# Patient Record
Sex: FEMALE | Race: BLACK OR AFRICAN AMERICAN | NOT HISPANIC OR LATINO | Employment: UNEMPLOYED | URBAN - METROPOLITAN AREA
[De-identification: names, ages, dates, MRNs, and addresses within clinical notes are randomized per-mention and may not be internally consistent; named-entity substitution may affect disease eponyms.]

---

## 2020-04-20 ENCOUNTER — HOSPITAL ENCOUNTER (EMERGENCY)
Facility: HOSPITAL | Age: 40
Discharge: HOME | End: 2020-04-20
Attending: EMERGENCY MEDICINE
Payer: MEDICAID

## 2020-04-20 VITALS
HEART RATE: 90 BPM | DIASTOLIC BLOOD PRESSURE: 72 MMHG | WEIGHT: 236.69 LBS | OXYGEN SATURATION: 100 % | RESPIRATION RATE: 18 BRPM | TEMPERATURE: 98 F | BODY MASS INDEX: 38.2 KG/M2 | SYSTOLIC BLOOD PRESSURE: 165 MMHG

## 2020-04-20 DIAGNOSIS — S92.424A CLOSED NONDISPLACED FRACTURE OF DISTAL PHALANX OF RIGHT GREAT TOE, INITIAL ENCOUNTER: ICD-10-CM

## 2020-04-20 DIAGNOSIS — G62.9 NEUROPATHY: Primary | ICD-10-CM

## 2020-04-20 LAB — POCT GLUCOSE: 130 MG/DL (ref 70–110)

## 2020-04-20 PROCEDURE — 99283 EMERGENCY DEPT VISIT LOW MDM: CPT | Mod: 25,ER

## 2020-04-20 PROCEDURE — 82962 GLUCOSE BLOOD TEST: CPT | Mod: ER

## 2020-04-20 PROCEDURE — 25000003 PHARM REV CODE 250: Mod: ER | Performed by: EMERGENCY MEDICINE

## 2020-04-20 RX ORDER — HYDROCODONE BITARTRATE AND ACETAMINOPHEN 10; 325 MG/1; MG/1
1 TABLET ORAL
Status: COMPLETED | OUTPATIENT
Start: 2020-04-20 | End: 2020-04-20

## 2020-04-20 RX ADMIN — HYDROCODONE BITARTRATE AND ACETAMINOPHEN 1 TABLET: 10; 325 TABLET ORAL at 01:04

## 2020-04-20 NOTE — ED PROVIDER NOTES
Encounter Date: 2020       History     Chief Complaint   Patient presents with    Numbness     Left leg numbness and numbness to lips, hx of neuropathy, stated that she never had numbness to lips.Pt denied loss of taste and smell      41 y/o F with PMH of HTN, DM, peripheral neuropathy here with c/o right great toe pain and swelling that is worsening over the past several days. The pain is constant, worsens with palpation and walking. Patient reports breaking this toe several times previously due to her neuropathy. Patient additionally reporting general worsening of her neuropathy symptoms in her legs, and reports gradual progression of the tingling sensation into her hands, and her lips as well. She denies any fever, chills, nausea, vomiting, weakness, chest pain, headache, vision changes.         Review of patient's allergies indicates:  No Known Allergies  Past Medical History:   Diagnosis Date    Diabetes mellitus     Hypertension      Past Surgical History:   Procedure Laterality Date     SECTION       No family history on file.  Social History     Tobacco Use    Smoking status: Never Smoker   Substance Use Topics    Alcohol use: No    Drug use: No     Review of Systems   Constitutional: Negative for diaphoresis and fever.   HENT: Negative for congestion, dental problem and sore throat.    Eyes: Negative for pain and visual disturbance.   Respiratory: Negative for cough and shortness of breath.    Cardiovascular: Negative for chest pain and palpitations.   Gastrointestinal: Negative for abdominal pain, diarrhea, nausea and vomiting.   Genitourinary: Negative for dysuria and flank pain.   Musculoskeletal: Positive for arthralgias. Negative for back pain and neck pain.   Skin: Negative for rash and wound.   Neurological: Negative for weakness, numbness and headaches.        Tingling.    Psychiatric/Behavioral: Negative for agitation and confusion.       Physical Exam     Initial Vitals  [04/20/20 1246]   BP Pulse Resp Temp SpO2   (!) 165/72 90 18 98.4 °F (36.9 °C) 100 %      MAP       --         Physical Exam    Constitutional: She appears well-developed and well-nourished.   HENT:   Head: Normocephalic and atraumatic.   Eyes: EOM are normal. Pupils are equal, round, and reactive to light.   Neck: Normal range of motion. Neck supple.   Cardiovascular: Normal rate and regular rhythm.   Pulmonary/Chest: Breath sounds normal. No respiratory distress.   Abdominal: She exhibits no distension. There is no tenderness.   Musculoskeletal:   There is tenderness to the right great toe, and MTP joint. There is edema as well. No redness, or warmth. Patient with good ROM.    Neurological: She is alert and oriented to person, place, and time. She has normal strength. No cranial nerve deficit. GCS score is 15. GCS eye subscore is 4. GCS verbal subscore is 5. GCS motor subscore is 6.   No decreased sensation to light touch.    Skin: Skin is warm and dry.   Psychiatric: She has a normal mood and affect.         ED Course   Procedures  Labs Reviewed   POCT GLUCOSE - Abnormal; Notable for the following components:       Result Value    POCT Glucose 130 (*)     All other components within normal limits   POCT GLUCOSE MONITORING CONTINUOUS          Imaging Results          X-Ray Toe 2 or More Views Right (Final result)  Result time 04/20/20 13:05:43    Final result by Jake Mitchell MD (04/20/20 13:05:43)                 Impression:      See above.      Electronically signed by: Jake Mitchell MD  Date:    04/20/2020  Time:    13:05             Narrative:    EXAMINATION:  XR TOE 2 OR MORE VIEWS RIGHT    CLINICAL HISTORY:  XR TOE 2 OR MORE VIEWS RIGHT    COMPARISON:  None    FINDINGS:  Three views of the right 1st toe were obtained.    Comminuted fracture at the base of the distal phalanx of the 1st toe with associated soft tissue swelling fracture does appear to be extend to the interphalangeal joint..  Bony  mineralization is normal.                                                   ED Course as of Apr 20 1326   Mon Apr 20, 2020   1312 Buddy taped right great toe to the 2nd toe.     [BA]   1315 1:15 PM Reassessment: I reassessed the pt.  The pt is resting comfortably and is NAD.  Pt states their sx have improved. I Discussed test results, shared treatment plan, specific conditions for return, and the need for f/u. I  Answered their questions at this time.  Pt understands and agrees to the plan.  The pt has remained hemodynamically stable through ED course and is stable for discharge.       [BA]   1325 Toes N/V intact s/p buddy taping and post op shoe    [BA]      ED Course User Index  [BA] Omkar Fishman MD                Clinical Impression:       ICD-10-CM ICD-9-CM   1. Neuropathy G62.9 355.9   2. Closed nondisplaced fracture of distal phalanx of right great toe, initial encounter S92.424A 826.0             ED Disposition Condition    Discharge Stable        ED Prescriptions     None        Follow-up Information     Follow up With Specialties Details Why Contact Info    [facility]  Call in 2 days For re-evaluation and further treatment [address] [facility] [address] [place] [id number] [phone]      Pratik Trivedi,  Orthopedic Surgery Schedule an appointment as soon as possible for a visit in 1 week For re-evaluation and further treatment [address] [place] [address] DR Tomasz BERNAL [id number]  [phone]      [facility] Emergency [facility] Call today If symptoms worsen, For re-evaluation and further treatment, As needed [address] [address] [address] [phone]                                     Omkar Fishman MD  04/20/20 1315       Omkar Fishman MD  04/20/20 8826

## 2020-06-11 ENCOUNTER — HOSPITAL ENCOUNTER (EMERGENCY)
Facility: HOSPITAL | Age: 40
Discharge: HOME | End: 2020-06-11
Attending: EMERGENCY MEDICINE
Payer: MEDICAID

## 2020-06-11 VITALS
RESPIRATION RATE: 20 BRPM | DIASTOLIC BLOOD PRESSURE: 89 MMHG | BODY MASS INDEX: 38.79 KG/M2 | HEART RATE: 92 BPM | OXYGEN SATURATION: 99 % | TEMPERATURE: 99 F | SYSTOLIC BLOOD PRESSURE: 159 MMHG | WEIGHT: 240.31 LBS

## 2020-06-11 DIAGNOSIS — E11.42 DIABETIC POLYNEUROPATHY ASSOCIATED WITH TYPE 2 DIABETES MELLITUS: Primary | ICD-10-CM

## 2020-06-11 PROCEDURE — 25000003 PHARM REV CODE 250: Mod: ER | Performed by: EMERGENCY MEDICINE

## 2020-06-11 PROCEDURE — 99283 EMERGENCY DEPT VISIT LOW MDM: CPT | Mod: ER

## 2020-06-11 RX ORDER — HYDROCODONE BITARTRATE AND ACETAMINOPHEN 10; 325 MG/1; MG/1
1 TABLET ORAL
Status: COMPLETED | OUTPATIENT
Start: 2020-06-11 | End: 2020-06-11

## 2020-06-11 RX ORDER — GABAPENTIN 800 MG/1
800 TABLET ORAL 3 TIMES DAILY
COMMUNITY

## 2020-06-11 RX ORDER — LISINOPRIL 20 MG/1
20 TABLET ORAL DAILY
COMMUNITY

## 2020-06-11 RX ADMIN — HYDROCODONE BITARTRATE AND ACETAMINOPHEN 1 TABLET: 10; 325 TABLET ORAL at 01:06

## 2020-06-18 NOTE — ED PROVIDER NOTES
"Encounter Date: 2020       History     Chief Complaint   Patient presents with    Leg Pain     Pt states "my diabetic neuropathy is hurting me in my right leg".     Patient presents with concerns regarding pain in the BLE.  She describes chronic shooting sensation in the R > L legs.  Onset many months ago.  Denies any recent injury.  Patient has been taking Gabapentin but notes her symptoms have now allowed her to rest tonight.          Review of patient's allergies indicates:  No Known Allergies  Past Medical History:   Diagnosis Date    Diabetes mellitus     Hypertension      Past Surgical History:   Procedure Laterality Date     SECTION       History reviewed. No pertinent family history.  Social History     Tobacco Use    Smoking status: Never Smoker   Substance Use Topics    Alcohol use: No    Drug use: No     Review of Systems   Constitutional: Negative for chills and fever.   HENT: Negative for congestion and rhinorrhea.    Respiratory: Negative for cough, chest tightness, shortness of breath and wheezing.    Cardiovascular: Negative for chest pain, palpitations and leg swelling.   Gastrointestinal: Negative for abdominal pain, constipation, diarrhea, nausea and vomiting.   Genitourinary: Negative for dysuria, frequency, urgency, vaginal bleeding and vaginal discharge.   Skin: Negative for color change and rash.   Allergic/Immunologic: Negative for immunocompromised state.   Neurological: Negative for dizziness, weakness and numbness.   Hematological: Negative for adenopathy. Does not bruise/bleed easily.   All other systems reviewed and are negative.      Physical Exam     Initial Vitals [20 0028]   BP Pulse Resp Temp SpO2   (!) 159/89 92 20 98.6 °F (37 °C) 99 %      MAP       --         Physical Exam    Nursing note and vitals reviewed.  Constitutional: She appears well-developed and well-nourished. She is not diaphoretic. No distress.   HENT:   Head: Normocephalic and atraumatic. "   Cardiovascular: Normal rate, regular rhythm, normal heart sounds and intact distal pulses.   Pulmonary/Chest: Breath sounds normal. No respiratory distress.   Musculoskeletal:        Right foot: No tenderness, bony tenderness, swelling, crepitus, deformity or laceration.        Left foot: No tenderness, bony tenderness, swelling, crepitus, deformity or laceration.      Comments: DPA pulses 2+ bilaterally, skin pink and warm; no gross wounds appreciated; no signs of infection   Neurological: She is alert and oriented to person, place, and time. She has normal strength.   Skin: Skin is warm and dry.         ED Course   Procedures  Labs Reviewed - No data to display       Imaging Results    None          Medical Decision Making:   ED Management:  All findings were reviewed with the patient/family in detail.  I see no indication of an emergent process beyond that addressed during our encounter but have duly counseled the patient/family regarding the need for prompt follow-up as well as the indications that should prompt immediate return to the emergency room should new or worrisome developments occur.  The patient has additionally been provided with printed information regarding diagnosis as well as instructions regarding follow up and any medications intended to manage the patient's aforementioned conditions.  The patient/family communicates understanding of all this information and all remaining questions and concerns were addressed at this time.                                       Clinical Impression:       ICD-10-CM ICD-9-CM   1. Diabetic polyneuropathy associated with type 2 diabetes mellitus  E11.42 250.60     357.2             ED Disposition Condition    Discharge Stable        ED Prescriptions     None        Follow-up Information     Follow up With Specialties Details Why Contact Info    [facility]  Schedule an appointment as soon as possible for a visit  for reassessment [address] [place] [address]  [address]  [facility] [address]  [phone]                                       Cisco Crooks MD  06/18/20 1225

## 2021-08-27 ENCOUNTER — HOSPITAL ENCOUNTER (EMERGENCY)
Facility: HOSPITAL | Age: 41
Discharge: HOME | End: 2021-08-27
Attending: EMERGENCY MEDICINE
Payer: MEDICAID

## 2021-08-27 VITALS
HEART RATE: 97 BPM | BODY MASS INDEX: 39.46 KG/M2 | SYSTOLIC BLOOD PRESSURE: 187 MMHG | RESPIRATION RATE: 20 BRPM | WEIGHT: 244.5 LBS | TEMPERATURE: 99 F | OXYGEN SATURATION: 98 % | DIASTOLIC BLOOD PRESSURE: 112 MMHG

## 2021-08-27 DIAGNOSIS — S39.012A LUMBOSACRAL STRAIN, INITIAL ENCOUNTER: ICD-10-CM
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
## 2021-08-27 DIAGNOSIS — S16.1XXA CERVICAL STRAIN, ACUTE, INITIAL ENCOUNTER: ICD-10-CM

## 2021-08-27 PROCEDURE — 99284 EMERGENCY DEPT VISIT MOD MDM: CPT | Mod: ER

## 2021-08-27 PROCEDURE — 25000003 PHARM REV CODE 250: Mod: ER | Performed by: EMERGENCY MEDICINE

## 2021-08-27 RX ORDER — ACETAMINOPHEN 325 MG/1
650 TABLET ORAL
Status: COMPLETED | OUTPATIENT
Start: 2021-08-27 | End: 2021-08-27

## 2021-08-27 RX ORDER — MELOXICAM 7.5 MG/1
7.5 TABLET ORAL DAILY
Qty: 7 TABLET | Refills: 0 | Status: SHIPPED | OUTPATIENT
Start: 2021-08-27 | End: 2022-02-08 | Stop reason: ALTCHOICE

## 2021-08-27 RX ORDER — METHOCARBAMOL 500 MG/1
1000 TABLET, FILM COATED ORAL 3 TIMES DAILY
Qty: 30 TABLET | Refills: 0 | Status: SHIPPED | OUTPATIENT
Start: 2021-08-27 | End: 2021-09-01

## 2021-08-27 RX ADMIN — ACETAMINOPHEN 650 MG: 325 TABLET ORAL at 12:08

## 2022-01-08 ENCOUNTER — HOSPITAL ENCOUNTER (EMERGENCY)
Facility: HOSPITAL | Age: 42
Discharge: HOME | End: 2022-01-09
Attending: EMERGENCY MEDICINE
Payer: MEDICAID
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
## 2022-01-08 DIAGNOSIS — S92.354A CLOSED NONDISPLACED FRACTURE OF FIFTH METATARSAL BONE OF RIGHT FOOT, INITIAL ENCOUNTER: Primary | ICD-10-CM

## 2022-01-08 PROCEDURE — 25000003 PHARM REV CODE 250: Mod: ER | Performed by: EMERGENCY MEDICINE

## 2022-01-08 PROCEDURE — 99284 EMERGENCY DEPT VISIT MOD MDM: CPT | Mod: 25,ER

## 2022-01-08 PROCEDURE — 29515 APPLICATION SHORT LEG SPLINT: CPT | Mod: RT,ER

## 2022-01-08 PROCEDURE — 96372 THER/PROPH/DIAG INJ SC/IM: CPT | Mod: ER

## 2022-01-08 RX ORDER — ACETAMINOPHEN 500 MG
1000 TABLET ORAL
Status: COMPLETED | OUTPATIENT
Start: 2022-01-08 | End: 2022-01-08

## 2022-01-08 RX ADMIN — ACETAMINOPHEN 1000 MG: 500 TABLET ORAL at 11:01

## 2022-01-09 VITALS
RESPIRATION RATE: 18 BRPM | DIASTOLIC BLOOD PRESSURE: 98 MMHG | HEART RATE: 84 BPM | TEMPERATURE: 98 F | OXYGEN SATURATION: 100 % | WEIGHT: 230 LBS | HEIGHT: 66 IN | BODY MASS INDEX: 36.96 KG/M2 | SYSTOLIC BLOOD PRESSURE: 177 MMHG

## 2022-01-09 PROCEDURE — 29515 APPLICATION SHORT LEG SPLINT: CPT | Mod: RT,ER

## 2022-01-09 PROCEDURE — 63600175 PHARM REV CODE 636 W HCPCS: Mod: ER | Performed by: EMERGENCY MEDICINE

## 2022-01-09 RX ORDER — HYDROCODONE BITARTRATE AND ACETAMINOPHEN 5; 325 MG/1; MG/1
1 TABLET ORAL EVERY 4 HOURS PRN
Qty: 20 TABLET | Refills: 0 | Status: SHIPPED | OUTPATIENT
Start: 2022-01-09 | End: 2022-01-16

## 2022-01-09 RX ORDER — MORPHINE SULFATE 4 MG/ML
4 INJECTION, SOLUTION INTRAMUSCULAR; INTRAVENOUS
Status: COMPLETED | OUTPATIENT
Start: 2022-01-09 | End: 2022-01-09

## 2022-01-09 RX ADMIN — MORPHINE SULFATE 4 MG: 4 INJECTION INTRAVENOUS at 12:01

## 2022-01-09 NOTE — ED PROVIDER NOTES
Encounter Date: 2022       History     Chief Complaint   Patient presents with    Foot Injury     Pt walking on concrete and went to walk off and some how injured the right foot      Yoana Bowen is a 41 y.o. female who presents with sudden onset, severe, aching pain of the outer aspect of the right foot after slipping off of a concrete sidewalk edge just prior to arrival. She has had no prior episodes.  She has no other complaints or injuries.           Review of patient's allergies indicates:  No Known Allergies  Past Medical History:   Diagnosis Date    Cancer     Thyroid    Diabetes mellitus     Hypertension     Peripheral neuropathy      Past Surgical History:   Procedure Laterality Date    BIOPSY OF THYROID       SECTION      TOE SURGERY      TUBAL LIGATION       No family history on file.  Social History     Tobacco Use    Smoking status: Never Smoker    Smokeless tobacco: Never Used   Substance Use Topics    Alcohol use: No    Drug use: No     Review of Systems   Constitutional: Negative.  Negative for fever.   HENT: Negative.    Eyes: Negative.    Respiratory: Negative.    Cardiovascular: Negative.    Gastrointestinal: Negative.    Endocrine: Negative.    Genitourinary: Negative.    Musculoskeletal: Negative.    Skin: Negative.    Allergic/Immunologic: Negative.    Neurological: Negative.  Negative for weakness and numbness.   Hematological: Negative.    Psychiatric/Behavioral: Negative.    All other systems reviewed and are negative.      Physical Exam     Initial Vitals [22 2325]   BP Pulse Resp Temp SpO2   (!) 166/92 94 18 98.6 °F (37 °C) 100 %      MAP       --         Physical Exam    Nursing note and vitals reviewed.  Constitutional: She appears well-developed and well-nourished. No distress.   In wheelchair   HENT:   Head: Normocephalic and atraumatic.   Eyes: Conjunctivae and EOM are normal. Pupils are equal, round, and reactive to light.   Neck: Neck supple.    Cardiovascular: Normal rate, regular rhythm and intact distal pulses.   Pulmonary/Chest: No respiratory distress.   Abdominal: She exhibits no distension.   Musculoskeletal:         General: Normal range of motion.      Cervical back: Neck supple.      Comments: FROM, NTTP of extremity joints except: right 4th and 5th metatarsal and toes 3 through 5 tender     Neurological: She is alert and oriented to person, place, and time. She has normal strength.   Skin: Skin is warm and dry. Capillary refill takes less than 2 seconds.   Psychiatric: She has a normal mood and affect.         ED Course   Fracture Care: Closed treatment of right fifth metatarsal diaphyseal fracture, without manipulation    Date/Time: 1/9/2022 12:16 AM  Performed by: Darien Kaur MD  Authorized by: Darien Kaur MD     Location procedure was performed:  [facility] EMERGENCY DEPARTMENT  Consent Done?:  Yes  Universal Protocol:     Verbal consent obtained?: Yes    Injury:     Injury location:  Foot    Location details:  Right foot    Injury type:  Fracture    Fracture type: fifth metatarsal        Pre-procedure assessment:     Neurovascular status: Neurovascularly intact      Distal perfusion: normal      Neurological function: normal      Range of motion: normal      Local anesthesia used?: No      Patient sedated?: No        Selections made in this section will also lock the Injury type section above.:     Manipulation performed?: No      Immobilization:  Splint    Splint type: posterior ankle splint.    Supplies used:  Cotton padding, elastic bandage and Ortho-Glass    Complications: No      Specimens: No      Implants: No    Post-procedure assessment:     Neurovascular status: Neurovascularly intact      Distal perfusion: normal      Neurological function: normal      Range of motion: splinted      Patient tolerance:  Patient tolerated the procedure well with no immediate complications     Imaging obtained. Pain medication prescribed.   Anticipatory guidance and fracture care discussed, and routine follow up provided.         Labs Reviewed - No data to display       Imaging Results          X-Ray Foot Complete Right (Final result)  Result time 01/08/22 23:58:19    Final result by Malika Eddy MD (01/08/22 23:58:19)                 Impression:      As above      Electronically signed by: Surjit Daly  Date:    01/08/2022  Time:    23:58             Narrative:    EXAMINATION:  XR FOOT COMPLETE 3 VIEW RIGHT    CLINICAL HISTORY:  . Unspecified fall, initial encounter    TECHNIQUE:  AP, lateral, and oblique views of the right foot were performed.    COMPARISON:  None    FINDINGS:  Longitudinal screw of the fixating the 1st toe interphalangeal joint.  No acute fracture or dislocation.  Bipartite sesamoid medial 1st metatarsal.  Transverse fracture at the base of the right 5th metatarsal.                                 Medications   morphine injection 4 mg (has no administration in time range)   acetaminophen tablet 1,000 mg (1,000 mg Oral Given 1/8/22 2342)            No results found for this or any previous visit (from the past 24 hour(s)).    Medications   morphine injection 4 mg (has no administration in time range)   acetaminophen tablet 1,000 mg (1,000 mg Oral Given 1/8/22 2342)     Patient's evaluation in the ED does not suggest any emergent or life threatening medical conditions requiring immediate intervention beyond what was provided in the ED, and I believe patient is safe for discharge.  Regardless, an unremarkable evaluation in the ED does not preclude the development or presence of a serious or life threatening condition. As such, patient was given return instructions for any change or worsening in symptoms.                   Clinical Impression:   Final diagnoses:  [W19.XXXA] Fall  [S92.354A] Closed nondisplaced fracture of fifth metatarsal bone of right foot, initial encounter (Primary)          ED Disposition Condition    Discharge  Stable        ED Prescriptions     Medication Sig Dispense Start Date End Date Auth. Provider    HYDROcodone-acetaminophen (NORCO) 5-325 mg per tablet Take 1 tablet by mouth every 4 (four) hours as needed for Pain. 20 tablet 1/9/2022 1/16/2022 Darien Kaur MD        Follow-up Information     Follow up With Specialties Details Why Contact Info    Nory Tan DPM Podiatry Schedule an appointment as soon as possible for a visit in 5 days  [address] [place] [address] DR Tomasz BERNAL [address] [phone]      [facility] - Emergency Dept Emergency Medicine  As needed, If symptoms worsen [id number] [facility] [address]  [facility] [address] [phone]              Darien Kaur MD  01/09/22 0030

## 2022-01-13 ENCOUNTER — TELEPHONE (OUTPATIENT)
Dept: PODIATRY | Facility: CLINIC | Age: 42
End: 2022-01-13
Payer: MEDICAID

## 2022-01-13 NOTE — TELEPHONE ENCOUNTER
Unable to LVM    ----- Message from Tammy Cormier sent at 1/12/2022 11:19 AM CST -----  Regarding: appt  Contact: patient  Patient was seen in ED and told to follow up with Podiatry for foot issue, please call her back at [phone]

## 2022-02-08 ENCOUNTER — HOSPITAL ENCOUNTER (EMERGENCY)
Facility: HOSPITAL | Age: 42
Discharge: HOME | End: 2022-02-08
Attending: EMERGENCY MEDICINE
Payer: MEDICAID

## 2022-02-08 VITALS
SYSTOLIC BLOOD PRESSURE: 168 MMHG | HEIGHT: 66 IN | RESPIRATION RATE: 18 BRPM | DIASTOLIC BLOOD PRESSURE: 95 MMHG | WEIGHT: 242.5 LBS | OXYGEN SATURATION: 100 % | BODY MASS INDEX: 38.97 KG/M2 | TEMPERATURE: 99 F | HEART RATE: 77 BPM

## 2022-02-08 DIAGNOSIS — K05.30 PERIODONTITIS: Primary | ICD-10-CM

## 2022-02-08 DIAGNOSIS — K02.9 DENTAL CARIES: ICD-10-CM

## 2022-02-08 PROCEDURE — 99284 EMERGENCY DEPT VISIT MOD MDM: CPT | Mod: 25,ER

## 2022-02-08 PROCEDURE — 25000003 PHARM REV CODE 250: Mod: ER | Performed by: EMERGENCY MEDICINE

## 2022-02-08 RX ORDER — NAPROXEN 500 MG/1
500 TABLET ORAL
Status: COMPLETED | OUTPATIENT
Start: 2022-02-08 | End: 2022-02-08

## 2022-02-08 RX ORDER — PENICILLIN V POTASSIUM 500 MG/1
500 TABLET, FILM COATED ORAL 4 TIMES DAILY
Qty: 40 TABLET | Refills: 0 | Status: SHIPPED | OUTPATIENT
Start: 2022-02-08 | End: 2022-02-15

## 2022-02-08 RX ORDER — NAPROXEN 500 MG/1
500 TABLET ORAL 2 TIMES DAILY WITH MEALS
Qty: 20 TABLET | Refills: 0 | Status: SHIPPED | OUTPATIENT
Start: 2022-02-08

## 2022-02-08 RX ADMIN — NAPROXEN 500 MG: 500 TABLET ORAL at 10:02

## 2022-02-09 NOTE — ED PROVIDER NOTES
Encounter Date: 2022       History     Chief Complaint   Patient presents with    Abscess     Abccess that is in the lower left side of the mouth. Started yesterday. 10/10     The history is provided by the patient.   Dental Pain  The primary symptoms include mouth pain. Primary symptoms do not include dental injury, oral bleeding, oral lesions, headaches, fever, shortness of breath, sore throat or angioedema. The symptoms began two days ago. The symptoms are unchanged. The symptoms are recurrent. The symptoms occur constantly.   Mouth pain began 24 -48 hours ago. Mouth pain occurs constantly. Mouth pain is unchanged. Affected locations include: teeth and gum(s). Pain scale at highest for mouth pain: mild. Pain scale currently for mouth pain: mild.   Additional symptoms include: dental sensitivity to temperature, gum swelling and gum tenderness. Additional symptoms do not include: purulent gums, trismus, jaw pain, facial swelling, trouble swallowing, pain with swallowing, excessive salivation, dry mouth, taste disturbance, smell disturbance, drooling, ear pain, hearing loss, nosebleeds, swollen glands, goiter and fatigue. Medical issues include: periodontal disease.     Review of patient's allergies indicates:  No Known Allergies  Past Medical History:   Diagnosis Date    Cancer     Thyroid    Diabetes mellitus     Hypertension     Peripheral neuropathy      Past Surgical History:   Procedure Laterality Date    BIOPSY OF THYROID       SECTION      TOE SURGERY      TUBAL LIGATION       No family history on file.  Social History     Tobacco Use    Smoking status: Never Smoker    Smokeless tobacco: Never Used   Substance Use Topics    Alcohol use: No    Drug use: No     Review of Systems   Constitutional: Negative for fatigue and fever.   HENT: Negative for drooling, ear pain, facial swelling, hearing loss, nosebleeds, sore throat and trouble swallowing.    Respiratory: Negative for shortness  of breath.    Cardiovascular: Negative for chest pain.   Gastrointestinal: Negative for nausea.   Genitourinary: Negative for dysuria.   Musculoskeletal: Negative for back pain.   Skin: Negative for rash.   Neurological: Negative for weakness and headaches.   Hematological: Does not bruise/bleed easily.   All other systems reviewed and are negative.      Physical Exam     Initial Vitals [02/08/22 2213]   BP Pulse Resp Temp SpO2   (!) 217/120 78 20 98.8 °F (37.1 °C) 100 %      MAP       --         Physical Exam    Nursing note and vitals reviewed.  Constitutional: She appears well-developed and well-nourished.   HENT:   Head: Normocephalic and atraumatic.   Right Ear: Hearing normal.   Left Ear: Hearing normal.   Nose: Nose normal.   Mouth/Throat: Mucous membranes are normal. No trismus in the jaw. Abnormal dentition. Dental caries present. No dental abscesses or uvula swelling. No oropharyngeal exudate or posterior oropharyngeal edema.       ttp in area diagrammed. No active bleeding/discharge. No palpable fluctuance.    Eyes: Conjunctivae and EOM are normal. Pupils are equal, round, and reactive to light.   Neck: Trachea normal and phonation normal. Neck supple. No thyromegaly present.   Airway intact. Able to manage oral secretions   Normal range of motion.  Cardiovascular: Normal rate, regular rhythm, normal heart sounds and intact distal pulses. Exam reveals no gallop and no friction rub.    No murmur heard.  Pulmonary/Chest: Breath sounds normal. No respiratory distress. She has no wheezes. She has no rhonchi. She exhibits no tenderness.   Abdominal: Abdomen is soft. Bowel sounds are normal. She exhibits no distension. There is no abdominal tenderness. No hernia.   No right CVA tenderness.  No left CVA tenderness. There is no rebound and no guarding.   Musculoskeletal:         General: No tenderness or edema. Normal range of motion.      Cervical back: Normal range of motion and neck supple.  "    Lymphadenopathy:     She has no cervical adenopathy.   Neurological: She is alert and oriented to person, place, and time. She has normal strength. No cranial nerve deficit or sensory deficit. GCS score is 15. GCS eye subscore is 4. GCS verbal subscore is 5. GCS motor subscore is 6.   No acute focal neuro deficits   Skin: Skin is warm and dry. Capillary refill takes less than 2 seconds. No rash noted.   Psychiatric: She has a normal mood and affect. Her behavior is normal. Judgment and thought content normal.         ED Course   Procedures  Labs Reviewed - No data to display       Imaging Results    None          Medications   naproxen tablet 500 mg (500 mg Oral Given 2/8/22 2244)                       Vitals:    02/08/22 2213 02/08/22 2244 02/08/22 2245   BP: (!) 217/120  (!) 168/95   Pulse: 78  77   Resp: 20  18   Temp: 98.8 °F (37.1 °C) 98.8 °F (37.1 °C)    TempSrc: Oral     SpO2: 100%  100%   Weight: 110 kg (242 lb 8.1 oz)     Height: 5' 6" (1.676 m)         Results for orders placed or performed during the hospital encounter of 04/20/20   POCT glucose   Result Value Ref Range    POCT Glucose 130 (H) 70 - 110 mg/dL         Imaging Results    None         Medications   naproxen tablet 500 mg (500 mg Oral Given 2/8/22 2244)         10:40 PM - Re-evaluation: The patient is resting comfortably and is in no acute distress. She states that her symptoms have improved after treatment within ER. Discussed shared treatment plan, specific conditions for return, and importance of follow up with patient and family. Advised close f/u c pcp/dentist within one week and to return to ER if symptoms persist or worsen.  She understands and agrees with the plan as discussed. Answered  her questions at this time. She has remained hemodynamically stable throughout the ED course and is appropriate for discharge home.     Advised patient to make an appointment with dentist for examination and treatment of their dental pain, as well " as routine cleaning and disease prevention.  For prevention, patient was encouraged to: perform oral hygiene daily; have regular professional cleaning; brush teeth at least twice a day; floss daily; avoid constant sipping of sugary drinks or frequent sucking on candy and mints; and use toothpaste containing fluoride. Encouraged patient to gargle with warm salt water several times a day, continue to floss after eating, and complete full course of antibiotics.    Pre-hypertension/Hypertension: The pt has been informed that they may have pre-hypertension or hypertension based on a blood pressure reading in the ED. I recommend that the pt call the PCP listed on their discharge instructions or a physician of their choice this week to arrange f/u for further evaluation of possible pre-hypertension or hypertension.     Yoana Bowen was given a handout which discussed their disease process, precautions, and instructions for follow-up and therapy.     Follow-up Information     [facility]. Schedule an appointment as soon as possible for a visit in 1 week.    Why: or your dentist  Contact information:  [address]  [place] [address]  [phone]             [facility] - Internal Medicine. Schedule an appointment as soon as possible for a visit in 1 week.    Specialty: Internal Medicine  Contact information:  [address]  [facility] [id number] [phone]  Additional information:  Please park in surface lot and check in at main registration.           [facility] Emergency Dept.    Specialty: Emergency Medicine  Why: As needed, If symptoms worsen  Contact information:  [address]  [facility] [id number] [phone]                          Medication List      START taking these medications    naproxen 500 MG tablet  Commonly known as: NAPROSYN  Take 1 tablet (500 mg total) by mouth 2 (two) times daily with meals.     penicillin v potassium 500 MG tablet  Commonly known as:  VEETID  Take 1 tablet (500 mg total) by mouth 4 (four) times daily. for 7 days        ASK your doctor about these medications    gabapentin 800 MG tablet  Commonly known as: NEURONTIN     lisinopriL 20 MG tablet  Commonly known as: PRINIVIL,ZESTRIL     metFORMIN 500 MG (MOD) 24hr tablet  Commonly known as: GLUMETZA     NIFEdipine 60 MG (OSM) 24 hr tablet  Commonly known as: PROCARDIA-XL     VITAMIN D ORAL           Where to Get Your Medications      You can get these medications from any pharmacy    Bring a paper prescription for each of these medications  · naproxen 500 MG tablet  · penicillin v potassium 500 MG tablet        Current Discharge Medication List            ED Diagnosis  1. Periodontitis    2. Dental caries             Clinical Impression:   Final diagnoses:  [K05.30] Periodontitis (Primary)  [K02.9] Dental caries          ED Disposition Condition    Discharge Stable        ED Prescriptions     Medication Sig Dispense Start Date End Date Auth. Provider    naproxen (NAPROSYN) 500 MG tablet Take 1 tablet (500 mg total) by mouth 2 (two) times daily with meals. 20 tablet 2/8/2022  Bhavesh Ivey Jr., MD    penicillin v potassium (VEETID) 500 MG tablet Take 1 tablet (500 mg total) by mouth 4 (four) times daily. for 7 days 40 tablet 2/8/2022 2/15/2022 Bhavesh Ivey Jr., MD        Follow-up Information     Follow up With Specialties Details Why Contact Info Additional Information    [facility]  Schedule an appointment as soon as possible for a visit in 1 week or your dentist [address]  [place] [address]  [phone]       [facility] - Internal Medicine Internal Medicine Schedule an appointment as soon as possible for a visit in 1 week  [address]  [facility] [id number] [phone] Please park in surface lot and check in at main registration.    [facility] - Emergency Dept Emergency Medicine  As needed, If symptoms worsen [address]  [facility]  [address]  [phone]            Bhavesh Ivey Jr., MD  02/09/22 0457

## 2022-02-09 NOTE — DISCHARGE INSTRUCTIONS
Advised patient to make an appointment with dentist for examination and treatment of their dental pain, as well as routine cleaning and disease prevention.  For prevention, patient was encouraged to: perform oral hygiene daily; have regular professional cleaning; brush teeth at least twice a day; floss daily; avoid constant sipping of sugary drinks or frequent sucking on candy and mints; and use toothpaste containing fluoride. Encouraged patient to gargle with warm salt water several times a day, continue to floss after eating, and complete full course of antibiotics.